# Patient Record
Sex: FEMALE | Race: WHITE | URBAN - METROPOLITAN AREA
[De-identification: names, ages, dates, MRNs, and addresses within clinical notes are randomized per-mention and may not be internally consistent; named-entity substitution may affect disease eponyms.]

---

## 2019-02-01 ENCOUNTER — OFFICE VISIT (OUTPATIENT)
Dept: URGENT CARE | Age: 24
End: 2019-02-01

## 2019-02-01 VITALS
HEIGHT: 65 IN | SYSTOLIC BLOOD PRESSURE: 128 MMHG | OXYGEN SATURATION: 98 % | WEIGHT: 155 LBS | DIASTOLIC BLOOD PRESSURE: 52 MMHG | HEART RATE: 79 BPM | RESPIRATION RATE: 16 BRPM | BODY MASS INDEX: 25.83 KG/M2 | TEMPERATURE: 97.7 F

## 2019-02-01 DIAGNOSIS — H66.92 ACUTE OTITIS MEDIA, LEFT: Primary | ICD-10-CM

## 2019-02-01 RX ORDER — AMOXICILLIN AND CLAVULANATE POTASSIUM 875; 125 MG/1; MG/1
1 TABLET, FILM COATED ORAL EVERY 12 HOURS
Qty: 10 TAB | Refills: 0 | Status: SHIPPED | OUTPATIENT
Start: 2019-02-01 | End: 2019-02-06

## 2019-02-01 RX ORDER — ESCITALOPRAM OXALATE 10 MG/1
10 TABLET ORAL DAILY
COMMUNITY

## 2019-02-01 NOTE — PROGRESS NOTES
Patient with recent flu symptoms and new left ear pain. Patient hasn't had a true ear infection since childhood but had issues with wax and eusteacian tube dysfunction about 3-4 years ago. The history is provided by the patient and a relative. Ear Pain   This is a new problem. The current episode started yesterday. The problem occurs constantly. The problem has not changed since onset. The symptoms are relieved by NSAIDs. She has tried Benadryl for the symptoms. The treatment provided mild (Tried ibuprofen and benadryl) relief. No past medical history on file. Past Surgical History:   Procedure Laterality Date    HX TONSIL AND ADENOIDECTOMY      HX TONSILLECTOMY           Family History   Problem Relation Age of Onset    Cancer Father     Diabetes Paternal Grandmother         Social History     Socioeconomic History    Marital status: SINGLE     Spouse name: Not on file    Number of children: Not on file    Years of education: Not on file    Highest education level: Not on file   Social Needs    Financial resource strain: Not on file    Food insecurity - worry: Not on file    Food insecurity - inability: Not on file   [employer] needs - medical: Not on file   [employer] needs - non-medical: Not on file   Occupational History    Not on file   Tobacco Use    Smoking status: Never Smoker   Substance and Sexual Activity    Alcohol use: No     Alcohol/week: 0.0 oz    Drug use: No    Sexual activity: No   Other Topics Concern    Not on file   Social History Narrative    Not on file                ALLERGIES: Patient has no known allergies. Review of Systems   Constitutional: Positive for chills and fever. For the past few nights, feels like this may be flu symptoms but declines testing for this today. HENT: Positive for ear pain. Negative for sinus pressure and sinus pain.         Vitals:    02/01/19 1602   BP: 128/52   Pulse: 79   Resp: 16   Temp: 97.7 °F (36.5 °C)   SpO2: 98%   Weight: 155 lb (70.3 kg)   Height: 5' 5\" (1.651 m)       Physical Exam   Constitutional: She appears well-developed and well-nourished. She does not appear ill. No distress. HENT:   Head: Normocephalic. Right Ear: Ear canal normal. No drainage. Tympanic membrane is not erythematous and not bulging. Left Ear: Ear canal normal. No drainage. Tympanic membrane is erythematous. Tympanic membrane is not bulging. Nose: Nose normal.   Reddened around the edges of the TM but without pus, exudate or bulging. Cardiovascular: Normal rate, regular rhythm and normal heart sounds. Pulmonary/Chest: Effort normal.   Skin: She is not diaphoretic. MDM    ICD-10-CM ICD-9-CM    1. Acute otitis media, left H66.92 382.9 amoxicillin-clavulanate (AUGMENTIN) 875-125 mg per tablet     Medications Ordered Today   Medications    amoxicillin-clavulanate (AUGMENTIN) 875-125 mg per tablet     Sig: Take 1 Tab by mouth every twelve (12) hours for 5 days. Dispense:  10 Tab     Refill:  0     No results found for any visits on 02/01/19. The patients condition was discussed with the patient and they understand. The patient is to follow up with primary care doctor. If signs and symptoms become worse the pt is to go to the ER. The patient is to take medications as prescribed. Patient with new ear pain and redness around the TM. Constitutional symptoms as well. Treat with Augmentin.      Procedures
